# Patient Record
Sex: MALE | Race: WHITE | Employment: OTHER
[De-identification: names, ages, dates, MRNs, and addresses within clinical notes are randomized per-mention and may not be internally consistent; named-entity substitution may affect disease eponyms.]

---

## 2022-01-01 ENCOUNTER — HOME CARE VISIT (OUTPATIENT)
Dept: SCHEDULING | Facility: HOME HEALTH | Age: 87
End: 2022-01-01
Payer: MEDICARE

## 2022-01-01 ENCOUNTER — HOSPICE ADMISSION (OUTPATIENT)
Dept: HOSPICE | Facility: HOSPICE | Age: 87
End: 2022-01-01
Payer: MEDICARE

## 2022-01-01 VITALS
DIASTOLIC BLOOD PRESSURE: 78 MMHG | SYSTOLIC BLOOD PRESSURE: 145 MMHG | RESPIRATION RATE: 16 BRPM | TEMPERATURE: 99.2 F | HEIGHT: 64 IN | BODY MASS INDEX: 21.34 KG/M2 | WEIGHT: 125 LBS | HEART RATE: 68 BPM

## 2022-01-01 VITALS — TEMPERATURE: 97.5 F | HEART RATE: 60 BPM | RESPIRATION RATE: 18 BRPM

## 2022-01-01 PROCEDURE — 6500000001 HSPC ELECTION

## 2022-01-01 PROCEDURE — 0651 HSPC ROUTINE HOME CARE

## 2022-01-01 PROCEDURE — G0155 HHCP-SVS OF CSW,EA 15 MIN: HCPCS

## 2022-01-01 PROCEDURE — G0299 HHS/HOSPICE OF RN EA 15 MIN: HCPCS

## 2022-01-01 PROCEDURE — 2500000001 HSPC NON INJECTABLE MED

## 2022-01-01 PROCEDURE — 3331090004 HSPC SERVICE INTENSITY ADD-ON

## 2022-01-01 ASSESSMENT — ENCOUNTER SYMPTOMS
DYSPNEA ACTIVITY LEVEL: AT REST
CONSTIPATION: 1
HEMOPTYSIS: 0

## 2022-08-11 PROBLEM — M47.816 DEGENERATIVE JOINT DISEASE (DJD) OF LUMBAR SPINE: Status: ACTIVE | Noted: 2020-03-29

## 2022-08-11 PROBLEM — E87.1 HYPONATREMIA: Status: ACTIVE | Noted: 2020-03-29

## 2022-08-11 PROBLEM — G56.01 CARPAL TUNNEL SYNDROME OF RIGHT WRIST: Status: ACTIVE | Noted: 2017-07-05

## 2022-08-11 PROBLEM — M40.209 KYPHOSIS: Status: ACTIVE | Noted: 2021-01-01

## 2022-08-11 PROBLEM — H40.9 GLAUCOMA: Status: ACTIVE | Noted: 2020-03-29

## 2022-08-11 PROBLEM — S32.512B: Status: ACTIVE | Noted: 2021-06-22

## 2022-08-11 PROBLEM — E03.9 HYPOTHYROIDISM: Status: ACTIVE | Noted: 2018-01-08

## 2022-08-11 PROBLEM — F41.8 SITUATIONAL ANXIETY: Status: ACTIVE | Noted: 2021-06-22

## 2022-08-11 PROBLEM — J96.01 ACUTE RESPIRATORY FAILURE WITH HYPOXIA (HCC): Status: ACTIVE | Noted: 2022-01-01

## 2022-08-11 PROBLEM — R60.0 PEDAL EDEMA: Status: ACTIVE | Noted: 2017-01-18

## 2022-08-11 PROBLEM — I50.23 ACUTE ON CHRONIC SYSTOLIC HEART FAILURE (HCC): Status: ACTIVE | Noted: 2019-11-17

## 2022-08-11 PROBLEM — J18.9 MULTIFOCAL PNEUMONIA: Status: ACTIVE | Noted: 2022-01-01

## 2022-08-11 PROBLEM — N18.32 STAGE 3B CHRONIC KIDNEY DISEASE (HCC): Status: ACTIVE | Noted: 2022-01-01

## 2022-08-11 PROBLEM — R20.0 NUMBNESS AND TINGLING IN RIGHT HAND: Status: ACTIVE | Noted: 2017-09-18

## 2022-08-11 PROBLEM — I44.2 ATRIOVENTRICULAR BLOCK, COMPLETE (HCC): Status: ACTIVE | Noted: 2017-03-15

## 2022-08-11 PROBLEM — H61.22 IMPACTED CERUMEN OF LEFT EAR: Status: ACTIVE | Noted: 2021-07-26

## 2022-08-11 PROBLEM — S32.020A CLOSED COMPRESSION FRACTURE OF SECOND LUMBAR VERTEBRA (HCC): Status: ACTIVE | Noted: 2020-04-07

## 2022-08-11 PROBLEM — Z95.0 PRESENCE OF CARDIAC PACEMAKER: Status: ACTIVE | Noted: 2017-03-15

## 2022-08-11 PROBLEM — R93.5 ABNORMAL CT OF THE ABDOMEN: Status: ACTIVE | Noted: 2022-01-01

## 2022-08-11 PROBLEM — R40.0 SLEEPINESS: Status: ACTIVE | Noted: 2022-01-01

## 2022-08-11 PROBLEM — M47.817 LUMBAR AND SACRAL ARTHRITIS: Status: ACTIVE | Noted: 2020-04-07

## 2022-08-11 PROBLEM — R20.2 NUMBNESS AND TINGLING IN RIGHT HAND: Status: ACTIVE | Noted: 2017-09-18

## 2022-08-11 NOTE — HOSPICE
COVID-19. Pt history, assessment, meds and status reviewed with patient's hospice attending MD, Dr. Romaine Diego. Family made aware of volunteer services but pending at this time due to COVID-19; plans to reassess volunteer needs once volunteer services become available. Patient is appropriate for hospice services at this time. [facility]HA to begin at 2x weekly for ADL support . SW and SC services accepted. Caregiver made aware that an RN will be completing a follow-up visit within 24 hours. Handwritten medication list, [email or web address] book and magnet with [employer] phone number left in home. RN educated Caregiver to call [email or web address] 24/7 phone line with any changes, concerns, or falls with verbalized understanding.

## 2022-08-13 NOTE — HOSPICE
Greeted at door by daughter, Sushma Fernandez and Shana Dickinson, wife of Brian. 24 hour visit completed per protocol with family in dining area, prior to patient assessment. Family has requested that Hospice terminolgy not be used when face to face with Brian. Assessment reviewed and updated as needed, medications reconcied and education began. See ROS and care plan for details. Of note:  Brian noted to be agitated at the visit. A dose of Xanax administered by family. Family has hired paid caregivers to remain with Brian throughout most of the day and evening. Brian's speech was slurred but he appeared to be coherent throughout the visit. Plan: SN visits 2 x week for 1 week then re-evaluate.  HHA vists 2x week through 11/5/22

## 2022-08-13 NOTE — HOSPICE
pts family called and stated that pt was not breathing. sn arrived. pt was absent of vital signs. pt was verified that pt had  at  1255 pm. Cat Odell was notified of pts death. pts family was present and coping well. sn called [facility]. sn called [facility] to have equipment picked up. Sn bathed pt. Family refused the need of a  or  today. all meds (xanax & roxanol) were disposed of by sn and patients family using a disopozo bag. Email sent to [facility] staff about pts death. [facility]  was faxed notification of pts death. Instructed family to call [facility] with any questions.